# Patient Record
Sex: MALE | Race: BLACK OR AFRICAN AMERICAN
[De-identification: names, ages, dates, MRNs, and addresses within clinical notes are randomized per-mention and may not be internally consistent; named-entity substitution may affect disease eponyms.]

---

## 2017-04-03 ENCOUNTER — HOSPITAL ENCOUNTER (EMERGENCY)
Dept: HOSPITAL 47 - EC | Age: 42
LOS: 1 days | Discharge: HOME | End: 2017-04-04
Payer: COMMERCIAL

## 2017-04-03 VITALS — TEMPERATURE: 98.2 F

## 2017-04-03 DIAGNOSIS — G40.909: Primary | ICD-10-CM

## 2017-04-03 DIAGNOSIS — F17.200: ICD-10-CM

## 2017-04-03 PROCEDURE — 36415 COLL VENOUS BLD VENIPUNCTURE: CPT

## 2017-04-03 PROCEDURE — 96360 HYDRATION IV INFUSION INIT: CPT

## 2017-04-03 PROCEDURE — 70450 CT HEAD/BRAIN W/O DYE: CPT

## 2017-04-03 PROCEDURE — 80053 COMPREHEN METABOLIC PANEL: CPT

## 2017-04-03 PROCEDURE — 84100 ASSAY OF PHOSPHORUS: CPT

## 2017-04-03 PROCEDURE — 96361 HYDRATE IV INFUSION ADD-ON: CPT

## 2017-04-03 PROCEDURE — 99284 EMERGENCY DEPT VISIT MOD MDM: CPT

## 2017-04-03 PROCEDURE — 80320 DRUG SCREEN QUANTALCOHOLS: CPT

## 2017-04-03 PROCEDURE — 83735 ASSAY OF MAGNESIUM: CPT

## 2017-04-03 PROCEDURE — 85025 COMPLETE CBC W/AUTO DIFF WBC: CPT

## 2017-04-03 PROCEDURE — 93005 ELECTROCARDIOGRAM TRACING: CPT

## 2017-04-04 VITALS — RESPIRATION RATE: 18 BRPM | HEART RATE: 87 BPM | SYSTOLIC BLOOD PRESSURE: 136 MMHG | DIASTOLIC BLOOD PRESSURE: 63 MMHG

## 2017-04-04 LAB
ALP SERPL-CCNC: 70 U/L (ref 38–126)
ALT SERPL-CCNC: 52 U/L (ref 21–72)
ANION GAP SERPL CALC-SCNC: 14 MMOL/L
AST SERPL-CCNC: 74 U/L (ref 17–59)
BASOPHILS # BLD AUTO: 0.1 K/UL (ref 0–0.2)
BASOPHILS NFR BLD AUTO: 1 %
BUN SERPL-SCNC: 21 MG/DL (ref 9–20)
CALCIUM SPEC-MCNC: 9.6 MG/DL (ref 8.4–10.2)
CH: 31.4
CHCM: 34
CHLORIDE SERPL-SCNC: 97 MMOL/L (ref 98–107)
CO2 SERPL-SCNC: 23 MMOL/L (ref 22–30)
EOSINOPHIL # BLD AUTO: 0.1 K/UL (ref 0–0.7)
EOSINOPHIL NFR BLD AUTO: 1 %
ERYTHROCYTE [DISTWIDTH] IN BLOOD BY AUTOMATED COUNT: 5.03 M/UL (ref 4.3–5.9)
ERYTHROCYTE [DISTWIDTH] IN BLOOD: 13.5 % (ref 11.5–15.5)
GLUCOSE SERPL-MCNC: 85 MG/DL (ref 74–99)
HCT VFR BLD AUTO: 46.6 % (ref 39–53)
HDW: 2.07
HGB BLD-MCNC: 15.5 GM/DL (ref 13–17.5)
LUC NFR BLD AUTO: 1 %
LYMPHOCYTES # SPEC AUTO: 1.3 K/UL (ref 1–4.8)
LYMPHOCYTES NFR SPEC AUTO: 10 %
MAGNESIUM SPEC-SCNC: 2.1 MG/DL (ref 1.6–2.3)
MCH RBC QN AUTO: 30.8 PG (ref 25–35)
MCHC RBC AUTO-ENTMCNC: 33.2 G/DL (ref 31–37)
MCV RBC AUTO: 92.6 FL (ref 80–100)
MONOCYTES # BLD AUTO: 0.6 K/UL (ref 0–1)
MONOCYTES NFR BLD AUTO: 5 %
NEUTROPHILS # BLD AUTO: 10.3 K/UL (ref 1.3–7.7)
NEUTROPHILS NFR BLD AUTO: 83 %
NON-AFRICAN AMERICAN GFR(MDRD): >60
PHOSPHATE SERPL-MCNC: 6 MG/DL (ref 2.5–4.5)
POTASSIUM SERPL-SCNC: 4 MMOL/L (ref 3.5–5.1)
PROT SERPL-MCNC: 7.7 G/DL (ref 6.3–8.2)
SODIUM SERPL-SCNC: 134 MMOL/L (ref 137–145)
WBC # BLD AUTO: 0.1 10*3/UL
WBC # BLD AUTO: 12.5 K/UL (ref 3.8–10.6)
WBC (PEROX): 12.38

## 2017-04-04 NOTE — ED
Medical Decision Making





- Lab Data


Result diagrams: 


 04/04/17 01:15





 04/04/17 01:15





 Lab Results











  04/04/17 04/04/17 Range/Units





  01:15 01:15 


 


WBC   12.5 H  (3.8-10.6)  k/uL


 


RBC   5.03  (4.30-5.90)  m/uL


 


Hgb   15.5  (13.0-17.5)  gm/dL


 


Hct   46.6  (39.0-53.0)  %


 


MCV   92.6  (80.0-100.0)  fL


 


MCH   30.8  (25.0-35.0)  pg


 


MCHC   33.2  (31.0-37.0)  g/dL


 


RDW   13.5  (11.5-15.5)  %


 


Plt Count   159  (150-450)  k/uL


 


Neutrophils %   83  %


 


Lymphocytes %   10  %


 


Monocytes %   5  %


 


Eosinophils %   1  %


 


Basophils %   1  %


 


Neutrophils #   10.3 H  (1.3-7.7)  k/uL


 


Lymphocytes #   1.3  (1.0-4.8)  k/uL


 


Monocytes #   0.6  (0-1.0)  k/uL


 


Eosinophils #   0.1  (0-0.7)  k/uL


 


Basophils #   0.1  (0-0.2)  k/uL


 


Sodium  134 L   (137-145)  mmol/L


 


Potassium  4.0   (3.5-5.1)  mmol/L


 


Chloride  97 L   ()  mmol/L


 


Carbon Dioxide  23   (22-30)  mmol/L


 


Anion Gap  14   mmol/L


 


BUN  21 H   (9-20)  mg/dL


 


Creatinine  1.30 H   (0.66-1.25)  mg/dL


 


Est GFR (MDRD) Af Amer  >60   (>60 ml/min/1.73 sqM)  


 


Est GFR (MDRD) Non-Af  >60   (>60 ml/min/1.73 sqM)  


 


Glucose  85   (74-99)  mg/dL


 


Calcium  9.6   (8.4-10.2)  mg/dL


 


Phosphorus  6.0 H   (2.5-4.5)  mg/dL


 


Magnesium  2.1   (1.6-2.3)  mg/dL


 


Total Bilirubin  1.1   (0.2-1.3)  mg/dL


 


AST  74 H   (17-59)  U/L


 


ALT  52   (21-72)  U/L


 


Alkaline Phosphatase  70   ()  U/L


 


Total Protein  7.7   (6.3-8.2)  g/dL


 


Albumin  4.5   (3.5-5.0)  g/dL


 


Serum Alcohol  <10   mg/dL














Disposition


Clinical Impression: 


 Seizure





Disposition: HOME SELF-CARE


Condition: Stable


Instructions:  New-Onset Seizure in Adults (ED)


Additional Instructions: 


Please follow-up with primary care physician and neurologist in the next couple 

days for recheck.  Have neurologist evaluation for possible seizures.  Return 

for increased seizures, worsening changing symptoms or other concerns.  Avoid 

alcohol.  No driving until 6 months seizure-free per state law.


Prescriptions: 


Phenytoin Sodium Extended [Dilantin] 100 mg PO TID #60 capsule


Referrals: 


Dea Syed MD [STAFF PHYSICIAN] - 1-2 days


None,Stated [Primary Care Provider] - 1-2 days


Cindy Smiley MD [STAFF PHYSICIAN] - 1-2 days

## 2017-04-04 NOTE — CT
EXAM:

  CT Head Without Intravenous Contrast.

 

CLINICAL HISTORY:

  Reason: seizure activity

 

TECHNIQUE:

  Axial computed tomography images of the head/brain without intravenous 

contrast.  CTDI is 57.40 mGy and DLP is 1072.30 mGy-cm  This CT exam was 

performed using one or more of the following dose reduction techniques: 

automated exposure control, adjustment of the mA and/or kV according to 

patient size, and/or use of iterative reconstruction technique.

 

COMPARISON:

  No relevant prior studies available.

 

FINDINGS:

  Brain:  There appears to be mild cerebellar volume loss.  No hemorrhage.

  No significant white matter disease.  No edema.

  Ventricles:  Unremarkable.  No ventriculomegaly.

  Bones/joints:  Unremarkable.  No acute fracture.

  Soft tissues:  Unremarkable.

  Vasculature:  Small amounts of intracranial atherosclerosis are present.

 

  Sinuses:  Unremarkable as visualized.  No acute sinusitis.

  Mastoid air cells:  Unremarkable as visualized.  No mastoid effusion.

 

IMPRESSION:     

1.  No acute intracranial abnormality is seen.

2.  Mild cerebellar volume loss.

## 2017-04-04 NOTE — ED
General Adult HPI





- General


Chief complaint: Seizure


Stated complaint: Poss seizure


Time Seen by Provider: 04/04/17 00:23


Source: patient, family, RN notes reviewed


Mode of arrival: wheelchair


Limitations: no limitations





- History of Present Illness


Initial comments: 





Patient is a pleasant 41-year-old male presenting to the emergency department 

following a seizure.  Incident occurred prior to arrival.  Patient had 

witnessed generalized seizure activity lasting approximately 3-4 minutes.  

Patient states he feels sore all over at this time and fatigued.  Patient did 

not feel quite normal prior to the episode.  Patient does admit to having 3 

beers earlier today.  Patient does have a history of seizure once previously 

associated with alcohol withdrawal.  This was around a year ago.  Patient 

states overall he does not drink very much anymore and has not been taking a 

lot recently.  Patient is not on any medication for seizures.  No injury.





- Related Data


 Previous Rx's











 Medication  Instructions  Recorded


 


Phenytoin Sodium Extended 100 mg PO TID #60 capsule 04/04/17





[Dilantin]  











 Allergies











Allergy/AdvReac Type Severity Reaction Status Date / Time


 


No Known Allergies Allergy   Verified 04/03/17 23:44














Review of Systems


ROS Statement: 


Those systems with pertinent positive or pertinent negative responses have been 

documented in the HPI.





ROS Other: All systems not noted in ROS Statement are negative.


Constitutional: Denies: fever


Eyes: Denies: eye pain


ENT: Denies: ear pain


Respiratory: Denies: cough


Cardiovascular: Denies: chest pain


Endocrine: Reports: fatigue


Gastrointestinal: Denies: abdominal pain


Genitourinary: Denies: dysuria


Musculoskeletal: Denies: back pain


Skin: Denies: rash


Neurological: Denies: weakness, confusion





Past Medical History


Past Medical History: Hypertension, Seizure Disorder


History of Any Multi-Drug Resistant Organisms: None Reported


Past Surgical History: Orthopedic Surgery


Past Psychological History: No Psychological Hx Reported


Smoking Status: Current every day smoker


Past Alcohol Use History: Occasional


Past Drug Use History: Marijuana





General Exam


Limitations: no limitations


General appearance: alert, in no apparent distress


Head exam: Present: atraumatic


Eye exam: Present: normal appearance, PERRL, EOMI.  Absent: nystagmus


ENT exam: Present: normal oropharynx


Neck exam: Present: normal inspection.  Absent: tenderness


Respiratory exam: Present: normal lung sounds bilaterally


Cardiovascular Exam: Present: regular rate, normal rhythm


GI/Abdominal exam: Present: soft.  Absent: tenderness


Extremities exam: Present: normal inspection.  Absent: pedal edema, calf 

tenderness


Neurological exam: Present: alert, oriented X3, CN II-XII intact.  Absent: 

motor sensory deficit


  ** Expanded


Patient oriented to: Present: person, place, time


Speech: Present: fluid speech


Cranial nerves: EOM's Intact: Normal, Facial Sensation: Normal


Sensory exam: Upper Extremity Light Touch: Normal, Lower Extremity Light Touch: 

Normal


Motor strength exam: RUE: 5, LUE: 5, RLE: 5, LLE: 5


Eye Response: (4) open spontaneously


Motor Response: (6) obeys commands


Verbal Response: (5) oriented


Psychiatric exam: Present: normal affect, normal mood





Course


 Vital Signs











  04/03/17 04/04/17





  23:39 00:08


 


Temperature 98.2 F 


 


Pulse Rate 84 87


 


Respiratory 20 18





Rate  


 


Blood Pressure 205/110 136/63


 


O2 Sat by Pulse 99 99





Oximetry  














EKG Findings





- EKG Comments:


EKG Findings:: Normal sinus rhythm at 89.  .  QRS 88.  .  .  

Normal axis.  Normal QRS.  Normal ST-T.





Medical Decision Making





- Medical Decision Making





Patient reexamined and resting comfortably in bed.  Patient symptom-free.  

Patient and family updated on results and need for follow-up.





- Lab Data


Result diagrams: 


 04/04/17 01:15





 04/04/17 01:15


 Lab Results











  04/04/17 04/04/17 Range/Units





  01:15 01:15 


 


WBC   12.5 H  (3.8-10.6)  k/uL


 


RBC   5.03  (4.30-5.90)  m/uL


 


Hgb   15.5  (13.0-17.5)  gm/dL


 


Hct   46.6  (39.0-53.0)  %


 


MCV   92.6  (80.0-100.0)  fL


 


MCH   30.8  (25.0-35.0)  pg


 


MCHC   33.2  (31.0-37.0)  g/dL


 


RDW   13.5  (11.5-15.5)  %


 


Plt Count   159  (150-450)  k/uL


 


Neutrophils %   83  %


 


Lymphocytes %   10  %


 


Monocytes %   5  %


 


Eosinophils %   1  %


 


Basophils %   1  %


 


Neutrophils #   10.3 H  (1.3-7.7)  k/uL


 


Lymphocytes #   1.3  (1.0-4.8)  k/uL


 


Monocytes #   0.6  (0-1.0)  k/uL


 


Eosinophils #   0.1  (0-0.7)  k/uL


 


Basophils #   0.1  (0-0.2)  k/uL


 


Sodium  134 L   (137-145)  mmol/L


 


Potassium  4.0   (3.5-5.1)  mmol/L


 


Chloride  97 L   ()  mmol/L


 


Carbon Dioxide  23   (22-30)  mmol/L


 


Anion Gap  14   mmol/L


 


BUN  21 H   (9-20)  mg/dL


 


Creatinine  1.30 H   (0.66-1.25)  mg/dL


 


Est GFR (MDRD) Af Amer  >60   (>60 ml/min/1.73 sqM)  


 


Est GFR (MDRD) Non-Af  >60   (>60 ml/min/1.73 sqM)  


 


Glucose  85   (74-99)  mg/dL


 


Calcium  9.6   (8.4-10.2)  mg/dL


 


Phosphorus  6.0 H   (2.5-4.5)  mg/dL


 


Magnesium  2.1   (1.6-2.3)  mg/dL


 


Total Bilirubin  1.1   (0.2-1.3)  mg/dL


 


AST  74 H   (17-59)  U/L


 


ALT  52   (21-72)  U/L


 


Alkaline Phosphatase  70   ()  U/L


 


Total Protein  7.7   (6.3-8.2)  g/dL


 


Albumin  4.5   (3.5-5.0)  g/dL


 


Serum Alcohol  <10   mg/dL














- Radiology Data


Radiology results: image reviewed (Computed tomography scan shows no acute 

process)





Disposition


Clinical Impression: 


 Seizure





Disposition: HOME SELF-CARE


Condition: Stable


Instructions:  New-Onset Seizure in Adults (ED)


Additional Instructions: 


Please follow-up with primary care physician and neurologist in the next couple 

days for recheck.  Have neurologist evaluation for possible seizures.  Return 

for increased seizures, worsening changing symptoms or other concerns.


Prescriptions: 


Phenytoin Sodium Extended [Dilantin] 100 mg PO TID #60 capsule


Referrals: 


None,Stated [Primary Care Provider] - 1-2 days


Cindy Smiley MD [STAFF PHYSICIAN] - 1-2 days


Dea Syed MD [STAFF PHYSICIAN] - 1-2 days

## 2018-10-23 ENCOUNTER — HOSPITAL ENCOUNTER (OUTPATIENT)
Dept: HOSPITAL 47 - RADXRMAIN | Age: 43
End: 2018-10-23
Attending: PHYSICIAN ASSISTANT
Payer: COMMERCIAL

## 2018-10-23 DIAGNOSIS — M17.0: Primary | ICD-10-CM

## 2018-10-23 NOTE — XR
EXAMINATION TYPE: XR knee complete bilateral

 

DATE OF EXAM: 10/23/2018

 

COMPARISON: NONE

 

HISTORY: Bilateral knee pain

 

TECHNIQUE: 3 views of the bilateral knees were obtained

 

FINDINGS: 

There is extensive tricompartmental arthropathy of both knees, right greater than left. Surgical casanova
ges are seen of the medial femoral condyle and medial proximal tibial metaphysis of the right knee. T
here is near bone-on-bone articulation of the lateral and medial compartment on the right and the sub
luxation. Protuberant marginal osteophytes are seen on the right with moderate atherosclerosis. On th
e left there is mild to moderate atherosclerosis and small marginal osteophytes. No suspicious osseou
s lesion. No acute fracture or dislocation of either knee.

 

IMPRESSION: Advanced tricompartmental arthropathy in the right and moderate tricompartmental arthropa
thy on the left. No acute fracture or dislocation.

## 2019-07-19 ENCOUNTER — HOSPITAL ENCOUNTER (OUTPATIENT)
Dept: HOSPITAL 47 - RADMRIMAIN | Age: 44
Discharge: HOME | End: 2019-07-19
Attending: FAMILY MEDICINE
Payer: COMMERCIAL

## 2019-07-19 DIAGNOSIS — S83.281A: ICD-10-CM

## 2019-07-19 DIAGNOSIS — M71.22: ICD-10-CM

## 2019-07-19 DIAGNOSIS — S83.511A: Primary | ICD-10-CM

## 2019-07-19 DIAGNOSIS — S83.241A: ICD-10-CM

## 2019-07-19 DIAGNOSIS — M17.0: ICD-10-CM

## 2019-07-19 DIAGNOSIS — S83.242A: ICD-10-CM

## 2019-07-19 DIAGNOSIS — S83.282A: ICD-10-CM

## 2019-07-21 NOTE — MR
EXAMINATION TYPE: MR knee LT wo con

 

DATE OF EXAM: 7/19/2019

 

COMPARISON: None

 

HISTORY: Lt  knee pain, injury 25 yrs ago

 

TECHNIQUE: Multiplanar, multisequence imaging of the left knee is performed without IV contrast.

 

FINDINGS:

 

There is patchy increased signal throughout most of the anterior horn lateral meniscus. There is incr
eased signal in the posterior horn medial meniscus extending to the superior surface. The anterior an
d posterior cruciate ligaments are intact. There is some osteophyte formation of the tibial spine ant
eriorly. There is minor spurring on the patella. There is a minute knee joint effusion. There is 2 x 
1 cm popliteal cyst noted.

 

There is hypertrophic spurring of the femoral and tibial condyles. There is intact medial and lateral
 collateral ligaments. There is no evidence of a fracture.

IMPRESSION: 

No evidence of ligament tear. 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 T
here is a horizontal tear of the posterior horn medial meniscus on the superior aspect.

 

There is complex tear of the anterior horn of the lateral meniscus. Small joint effusion and poplitea
l cyst. Minimal subcutaneous edema over the anterior infrapatellar tendon.

## 2019-09-12 ENCOUNTER — HOSPITAL ENCOUNTER (OUTPATIENT)
Dept: HOSPITAL 47 - PNWHC3 | Age: 44
Discharge: HOME | End: 2019-09-12
Attending: ANESTHESIOLOGY
Payer: COMMERCIAL

## 2019-09-12 DIAGNOSIS — Z53.9: Primary | ICD-10-CM
